# Patient Record
Sex: MALE | Race: WHITE | Employment: OTHER | URBAN - METROPOLITAN AREA
[De-identification: names, ages, dates, MRNs, and addresses within clinical notes are randomized per-mention and may not be internally consistent; named-entity substitution may affect disease eponyms.]

---

## 2018-10-17 PROBLEM — I10 ESSENTIAL HYPERTENSION, MALIGNANT: Status: ACTIVE | Noted: 2018-10-17

## 2018-10-17 PROBLEM — D51.9 VITAMIN B12 DEFICIENCY ANEMIA: Status: ACTIVE | Noted: 2018-10-17

## 2018-10-17 PROBLEM — D69.6 THROMBOCYTOPENIA, UNSPECIFIED (HCC): Status: ACTIVE | Noted: 2018-10-17

## 2018-11-14 DIAGNOSIS — D69.6 THROMBOCYTOPENIA, UNSPECIFIED (HCC): ICD-10-CM

## 2018-11-14 DIAGNOSIS — D51.9 ANEMIA DUE TO VITAMIN B12 DEFICIENCY, UNSPECIFIED B12 DEFICIENCY TYPE: ICD-10-CM

## 2018-11-14 LAB
ALBUMIN SERPL-MCNC: 3.8 G/DL (ref 3.9–4.9)
ALP BLD-CCNC: 122 U/L (ref 35–104)
ALT SERPL-CCNC: 56 U/L (ref 0–41)
ANION GAP SERPL CALCULATED.3IONS-SCNC: 15 MEQ/L (ref 7–13)
AST SERPL-CCNC: 50 U/L (ref 0–40)
BILIRUB SERPL-MCNC: 0.9 MG/DL (ref 0–1.2)
BUN BLDV-MCNC: 17 MG/DL (ref 8–23)
CALCIUM SERPL-MCNC: 9.6 MG/DL (ref 8.6–10.2)
CHLORIDE BLD-SCNC: 102 MEQ/L (ref 98–107)
CO2: 24 MEQ/L (ref 22–29)
CREAT SERPL-MCNC: 1.14 MG/DL (ref 0.7–1.2)
GFR AFRICAN AMERICAN: >60
GFR NON-AFRICAN AMERICAN: >60
GLOBULIN: 3.7 G/DL (ref 2.3–3.5)
GLUCOSE BLD-MCNC: 105 MG/DL (ref 74–109)
POTASSIUM SERPL-SCNC: 3.7 MEQ/L (ref 3.5–5.1)
SODIUM BLD-SCNC: 141 MEQ/L (ref 132–144)
TOTAL PROTEIN: 7.5 G/DL (ref 6.4–8.1)

## 2022-10-07 ENCOUNTER — NURSE ONLY (OUTPATIENT)
Dept: PRIMARY CARE CLINIC | Age: 82
End: 2022-10-07

## 2022-10-07 DIAGNOSIS — Z23 NEED FOR INFLUENZA VACCINATION: Primary | ICD-10-CM

## 2022-10-10 NOTE — ADDENDUM NOTE
Addended by: Brigid Brooke on: 10/10/2022 10:21 AM     Modules accepted: Orders, Level of Service, SmartSet

## 2023-01-01 ENCOUNTER — PATIENT OUTREACH (OUTPATIENT)
Dept: CARE COORDINATION | Facility: CLINIC | Age: 83
End: 2023-01-01
Payer: MEDICARE

## 2023-01-01 ENCOUNTER — DOCUMENTATION (OUTPATIENT)
Dept: CARE COORDINATION | Facility: CLINIC | Age: 83
End: 2023-01-01
Payer: MEDICARE

## 2023-01-01 ENCOUNTER — OFFICE VISIT (OUTPATIENT)
Dept: PRIMARY CARE | Facility: CLINIC | Age: 83
End: 2023-01-01
Payer: MEDICARE

## 2023-01-01 ENCOUNTER — TELEPHONE (OUTPATIENT)
Dept: PRIMARY CARE | Facility: CLINIC | Age: 83
End: 2023-01-01
Payer: MEDICARE

## 2023-01-01 VITALS
DIASTOLIC BLOOD PRESSURE: 86 MMHG | OXYGEN SATURATION: 95 % | HEIGHT: 71 IN | BODY MASS INDEX: 25.56 KG/M2 | TEMPERATURE: 96 F | WEIGHT: 182.6 LBS | SYSTOLIC BLOOD PRESSURE: 110 MMHG | HEART RATE: 80 BPM | RESPIRATION RATE: 18 BRPM

## 2023-01-01 DIAGNOSIS — Z83.3 FAMILY HISTORY OF DIABETES MELLITUS: Primary | ICD-10-CM

## 2023-01-01 DIAGNOSIS — I49.3 VENTRICULAR ECTOPY: ICD-10-CM

## 2023-01-01 DIAGNOSIS — R18.8 CIRRHOSIS OF LIVER WITH ASCITES, UNSPECIFIED HEPATIC CIRRHOSIS TYPE (MULTI): ICD-10-CM

## 2023-01-01 DIAGNOSIS — K74.60 CIRRHOSIS OF LIVER WITH ASCITES, UNSPECIFIED HEPATIC CIRRHOSIS TYPE (MULTI): ICD-10-CM

## 2023-01-01 DIAGNOSIS — I48.92 ATRIAL FLUTTER, UNSPECIFIED TYPE (MULTI): ICD-10-CM

## 2023-01-01 DIAGNOSIS — I27.20 PULMONARY HYPERTENSION (MULTI): ICD-10-CM

## 2023-01-01 DIAGNOSIS — I50.33 ACUTE ON CHRONIC DIASTOLIC CONGESTIVE HEART FAILURE (MULTI): ICD-10-CM

## 2023-01-01 DIAGNOSIS — D69.6 THROMBOCYTOPENIA, UNSPECIFIED (CMS-HCC): ICD-10-CM

## 2023-01-01 DIAGNOSIS — I47.29 NSVT (NONSUSTAINED VENTRICULAR TACHYCARDIA) (MULTI): ICD-10-CM

## 2023-01-01 LAB
ANION GAP IN SER/PLAS: 14 MMOL/L (ref 10–20)
APPEARANCE, URINE: ABNORMAL
BILIRUBIN, URINE: ABNORMAL
BLOOD, URINE: NEGATIVE
CALCIUM (MG/DL) IN SER/PLAS: 8.2 MG/DL (ref 8.6–10.3)
CALCIUM OXALATE CRYSTALS, URINE: ABNORMAL /HPF
CARBON DIOXIDE, TOTAL (MMOL/L) IN SER/PLAS: 21 MMOL/L (ref 21–32)
CHLORIDE (MMOL/L) IN SER/PLAS: 105 MMOL/L (ref 98–107)
COLOR, URINE: ABNORMAL
CREATININE (MG/DL) IN SER/PLAS: 1.44 MG/DL (ref 0.5–1.3)
ERYTHROCYTE DISTRIBUTION WIDTH (RATIO) BY AUTOMATED COUNT: 14.6 % (ref 11.5–14.5)
ERYTHROCYTE MEAN CORPUSCULAR HEMOGLOBIN CONCENTRATION (G/DL) BY AUTOMATED: 35 G/DL (ref 32–36)
ERYTHROCYTE MEAN CORPUSCULAR VOLUME (FL) BY AUTOMATED COUNT: 96 FL (ref 80–100)
ERYTHROCYTES (10*6/UL) IN BLOOD BY AUTOMATED COUNT: 3.69 X10E12/L (ref 4.5–5.9)
GFR MALE: 48 ML/MIN/1.73M2
GLUCOSE (MG/DL) IN SER/PLAS: 90 MG/DL (ref 74–99)
GLUCOSE, URINE: NEGATIVE MG/DL
HEMATOCRIT (%) IN BLOOD BY AUTOMATED COUNT: 35.4 % (ref 41–52)
HEMOGLOBIN (G/DL) IN BLOOD: 12.4 G/DL (ref 13.5–17.5)
HYALINE CASTS, URINE: ABNORMAL /LPF
KETONES, URINE: ABNORMAL MG/DL
LEUKOCYTE ESTERASE, URINE: ABNORMAL
LEUKOCYTES (10*3/UL) IN BLOOD BY AUTOMATED COUNT: 3 X10E9/L (ref 4.4–11.3)
MUCUS, URINE: ABNORMAL /LPF
NITRITE, URINE: NEGATIVE
PH, URINE: 5 (ref 5–8)
PLATELETS (10*3/UL) IN BLOOD AUTOMATED COUNT: 53 X10E9/L (ref 150–450)
POC HEMOGLOBIN A1C: 4.7 % (ref 4.2–6.5)
POTASSIUM (MMOL/L) IN SER/PLAS: 3.7 MMOL/L (ref 3.5–5.3)
PROSTATE SPECIFIC AG (NG/ML) IN SER/PLAS: 0.08 NG/ML (ref 0–4)
PROTEIN, URINE: ABNORMAL MG/DL
RBC, URINE: 24 /HPF (ref 0–5)
SODIUM (MMOL/L) IN SER/PLAS: 136 MMOL/L (ref 136–145)
SPECIFIC GRAVITY, URINE: 1.03 (ref 1–1.03)
SQUAMOUS EPITHELIAL CELLS, URINE: 1 /HPF
UREA NITROGEN (MG/DL) IN SER/PLAS: 20 MG/DL (ref 6–23)
URINE CULTURE: NORMAL
UROBILINOGEN, URINE: 4 MG/DL (ref 0–1.9)
WBC, URINE: 8 /HPF (ref 0–5)

## 2023-01-01 PROCEDURE — 3079F DIAST BP 80-89 MM HG: CPT | Performed by: FAMILY MEDICINE

## 2023-01-01 PROCEDURE — 1036F TOBACCO NON-USER: CPT | Performed by: FAMILY MEDICINE

## 2023-01-01 PROCEDURE — 1160F RVW MEDS BY RX/DR IN RCRD: CPT | Performed by: FAMILY MEDICINE

## 2023-01-01 PROCEDURE — 1159F MED LIST DOCD IN RCRD: CPT | Performed by: FAMILY MEDICINE

## 2023-01-01 PROCEDURE — 3074F SYST BP LT 130 MM HG: CPT | Performed by: FAMILY MEDICINE

## 2023-01-01 PROCEDURE — 83036 HEMOGLOBIN GLYCOSYLATED A1C: CPT | Performed by: FAMILY MEDICINE

## 2023-01-01 PROCEDURE — 99214 OFFICE O/P EST MOD 30 MIN: CPT | Performed by: FAMILY MEDICINE

## 2023-01-01 RX ORDER — TAMSULOSIN HYDROCHLORIDE 0.4 MG/1
0.4 CAPSULE ORAL NIGHTLY
COMMUNITY

## 2023-01-01 RX ORDER — SPIRONOLACTONE 25 MG/1
25 TABLET ORAL DAILY
COMMUNITY

## 2023-07-20 NOTE — PROGRESS NOTES
Spoke with patient's brother Oleksandr who was requesting help facilitating care for patient.     Patient was recently in hospital and signed out AMA and doesn't wish to continue intervention per his brother. Oleksandr is asking for help getting patient in hospice care. I asked for help getting patient moved up from is original appointment scheduled for 8/22/23 with Dr. Candelaria. We were able to help get patient in as New Patient on 7/24/23 with Dr. Garrido at 2:30pm.     I did explain that getting a PCP will be best to get started in orders for hospice care.

## 2023-07-21 NOTE — PROGRESS NOTES
Called to give information to patient's brother Oleksandr.     Hospice [facility] does in home hospice/comfort care.   [phone]   Fax: [phone]     They do need order from PCP/Provider before they are able to start care.   Fax Demo, OV note, Med list, and order for Hospice care. They will run through insurance prior to setting up first visit with patient, but this happens ASAP after receiving the order.

## 2023-07-24 PROBLEM — K74.60 CIRRHOSIS (MULTI): Status: ACTIVE | Noted: 2023-01-01

## 2023-07-24 PROBLEM — I50.33 ACUTE ON CHRONIC DIASTOLIC CONGESTIVE HEART FAILURE (MULTI): Status: ACTIVE | Noted: 2023-01-01

## 2023-07-24 PROBLEM — I10 ESSENTIAL HYPERTENSION, MALIGNANT: Status: ACTIVE | Noted: 2018-10-17

## 2023-07-24 PROBLEM — D69.6 THROMBOCYTOPENIA, UNSPECIFIED (CMS-HCC): Status: ACTIVE | Noted: 2018-10-17

## 2023-07-24 PROBLEM — I47.29 NSVT (NONSUSTAINED VENTRICULAR TACHYCARDIA) (MULTI): Status: ACTIVE | Noted: 2023-01-01

## 2023-07-24 PROBLEM — I49.3 VENTRICULAR ECTOPY: Status: ACTIVE | Noted: 2023-01-01

## 2023-07-24 PROBLEM — I27.20 PULMONARY HYPERTENSION (MULTI): Status: ACTIVE | Noted: 2023-01-01

## 2023-07-24 PROBLEM — I10 BENIGN ESSENTIAL HYPERTENSION: Status: ACTIVE | Noted: 2023-01-01

## 2023-07-24 PROBLEM — I48.92 ATRIAL FLUTTER (MULTI): Status: ACTIVE | Noted: 2023-01-01

## 2023-07-24 PROBLEM — D64.9 ANEMIA: Status: ACTIVE | Noted: 2023-01-01

## 2023-07-24 NOTE — PATIENT INSTRUCTIONS

## 2023-07-24 NOTE — PROGRESS NOTES
Subjective   Patient ID: Joseph Vaughn is a 83 y.o. male who presents for Establish Care (Having issues with sleeping through the night /A1c 4.7).    HPI   Patient is a new patient and is here for management of multiple medical problems.  He does not have a family doctor and his known ascites and has recently been told by his GI doctor that there is nothing they can do to help him.  He has complaints of nonocclusive right femoral vein thrombosis noted after ultrasound of the scrotum was performed.  He had no known history of blood clots.  At that time with his evaluation in the emergency department on 7/11/2023 his work-up for blood clots revealed no inciting factor.  He had negative travel history with no history of cancer or hormone usage.  In addition the patient denied chest pain or pleurisy.  His history was remarkable for cirrhosis for the past 1 year with chronic ascites.  His increased swelling in his scrotum was the reason for evaluation.  At the time evaluation there was positive fluid motion test on abdominal exam and testing in the emergency department was remarkable for GFR 52 albumin low at 2.2 total protein decreased at 6.7 with normal LFTs.  Hemoglobin was 11.7 hematocrit 33.5.  PT/INR revealed 15.3 INR 1.4.  BNP was elevated at 407.    The diagnosis of nonocclusive thrombus of the right femoral vein with no evidence of DVT within the left lower extremity was made.  In addition patient had a new small left basilar pleural effusion.  After evaluation it was discovered he appeared excessively fluid overloaded with lower extremity edema ascites and scrotal swelling.  EKG revealed atrial fibrillation with rapid ventricular response.  IV access was obtained troponin was slightly elevated at 39 however this was felt to be at his baseline.    It was discussed with the patient that due to the blood clot he would need blood thinners he was previously Eliquis and he stopped taking this as he felt that this  "was causing dizziness.  He was recommended to be started on heparin while in the emergency department patient was admitted for A-fib with rapid ventricular response  Review of Systems  cardiovascular:  no  palpitations or chest  pain   leg  edema   respiratory: yes  shortness  of  breath  endocrine:  no polydipsia,  no polyuria  fatigue  GI:   abdominal  pain   musculoskeletal:  yes  myalgia.. yes  arthralgia  Psych:     insomnia   All other  systems discussed  negative   Objective   /86   Pulse 80   Temp 35.6 °C (96 °F)   Resp 18   Ht 1.803 m (5' 11\")   Wt 82.8 kg (182 lb 9.6 oz)   SpO2 95%   BMI 25.47 kg/m²     Physical Exam  general: alert oriented x three   somewhat cachectic in appearance  HEENT hearing normal to voice  Neck supple    Lungs respirations non-labored.  Cardiovascular:   peripheral edema  Abdomen with fluid wave and obviously protuberant  Skin: warm and dry without rash  Psych: judgement and insight normal  Musculoskeletal:  ambulation normal, unsteadiness  lymph:negative cervical  LYMPADENOPATHY  thyroid: non palpable enlargement   Extremities +2 edema      ultrasound of the right upper quadrant revealed diffuse coarsened appearance of the hepatic parenchyma with irregularity of surface compatible with cirrhosis.  Liver measures 14 cm in sagittal dimension.  Portal vein is patent and measures 11 mm.  Pancreas is poorly visualized      Review of cardiology notes dated 9/26/2022 showed acute on chronic diastolic congestive heart failure atrial flutter was assessed.  No was stating that stent follow-up since COVID-19 2016 developed lower extremity edema hematology started him on Lasix and spironolactone with some improvement of his edema.    EKG revealed atrial flutter with variable block PVCs versus aberrantly conducted complexes nonspecific ST-T waves.     Review of note dated April/23 from [facility] shows patient presented with past medical history of A-fib status post " cardioversion 9/22 not on anticoagulation with CHF ejection fraction 40 to 45%, echo 9/22, liver cirrhosis and chronic anemia presented to ED with complaints of shortness of breath for the past 3 to 4 months.  Patient had intermittent left-sided nonradiating chest pain every couple days.  No orthopnea or PND was cardioverted 9/22 however patient did not follow-up with a cardiologist afterwards.  APOORVA done before cardioversion revealed left ventricular ejection fraction 40 to 45% severely dilated right atrium and left atrium moderate mitral regurg and tricuspid regurg and global left ventricular hypokinesis.  Past medical history revealed cardiac cath with stent placement.          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  APOORVA with cardioversion was discussed.  Assessment/Plan   Problem List Items Addressed This Visit       Acute on chronic diastolic congestive heart failure (CMS/HCC)     Hospice referral discussed in light of desire to hold off on additional treatment         Atrial flutter (CMS/HCC)     Patient has atrial flutter and    not interested in cardiology referral         NSVT (nonsustained ventricular tachycardia) (CMS/HCC)    Pulmonary hypertension (CMS/HCC)    Thrombocytopenia, unspecified (CMS/HCC)    Ventricular ectopy    Cirrhosis (CMS/HCC)     Patient  no  interested in further treatment and would like hospice please see records from         Relevant Orders    Referral to Hospice     Other Visit Diagnoses       Family history of diabetes mellitus    -  Primary    Relevant Orders    POCT glycosylated hemoglobin (Hb A1C) manually resulted (Completed)           Cirrhosis (CMS/HCC)  Patient  no  interested in further treatment and would like hospice please see records from    Atrial flutter (CMS/HCC)  Patient has atrial flutter and    not interested in cardiology referral    Acute on chronic diastolic congestive heart failure (CMS/HCC)  Hospice referral discussed in light of desire to hold off on additional treatment

## 2023-07-26 NOTE — TELEPHONE ENCOUNTER
Hospice calling to confirm their consult with pt today. Just making you aware should you have any questions

## 2023-07-28 NOTE — PROGRESS NOTES
Called pt's brother Oleksandr to check in and follow up after patient's appointment he had on Monday with Dr. Garrido. I assisted getting information over to hospice care for the PCP office and wanted to check in with patient/family to see how the process is going.     I left message asking patient and or Oleksandr to call back to update and let me know if there are any more issues or barriers.

## 2023-08-02 NOTE — PROGRESS NOTES
I received a voicemail from patient's brother Oleksandr. He was returning my follow up call about how the appointment and hospice set up was going. He stated that everything went smooth at the appointment with Dr. Garrido and was grateful that I was able to get patient scheduled with PCP.     Also he stated that hospice care had already been out to see the patient and things went smoothly there. He didn't have any questions or concerns but let me know he will call me if anything changes.

## 2023-08-22 ENCOUNTER — APPOINTMENT (OUTPATIENT)
Dept: PRIMARY CARE | Facility: CLINIC | Age: 83
End: 2023-08-22
Payer: MEDICARE